# Patient Record
Sex: MALE | Race: WHITE | Employment: OTHER | URBAN - NONMETROPOLITAN AREA
[De-identification: names, ages, dates, MRNs, and addresses within clinical notes are randomized per-mention and may not be internally consistent; named-entity substitution may affect disease eponyms.]

---

## 2020-01-30 ENCOUNTER — INITIAL CONSULT (OUTPATIENT)
Dept: PULMONOLOGY | Age: 70
End: 2020-01-30
Payer: MEDICARE

## 2020-01-30 VITALS
OXYGEN SATURATION: 98 % | WEIGHT: 271.6 LBS | BODY MASS INDEX: 43.65 KG/M2 | HEART RATE: 62 BPM | HEIGHT: 66 IN | DIASTOLIC BLOOD PRESSURE: 74 MMHG | SYSTOLIC BLOOD PRESSURE: 120 MMHG

## 2020-01-30 PROCEDURE — G8484 FLU IMMUNIZE NO ADMIN: HCPCS | Performed by: INTERNAL MEDICINE

## 2020-01-30 PROCEDURE — G8419 CALC BMI OUT NRM PARAM NOF/U: HCPCS | Performed by: INTERNAL MEDICINE

## 2020-01-30 PROCEDURE — 4040F PNEUMOC VAC/ADMIN/RCVD: CPT | Performed by: INTERNAL MEDICINE

## 2020-01-30 PROCEDURE — 3017F COLORECTAL CA SCREEN DOC REV: CPT | Performed by: INTERNAL MEDICINE

## 2020-01-30 PROCEDURE — 1036F TOBACCO NON-USER: CPT | Performed by: INTERNAL MEDICINE

## 2020-01-30 PROCEDURE — G8427 DOCREV CUR MEDS BY ELIG CLIN: HCPCS | Performed by: INTERNAL MEDICINE

## 2020-01-30 PROCEDURE — 1123F ACP DISCUSS/DSCN MKR DOCD: CPT | Performed by: INTERNAL MEDICINE

## 2020-01-30 PROCEDURE — 99203 OFFICE O/P NEW LOW 30 MIN: CPT | Performed by: INTERNAL MEDICINE

## 2020-01-30 RX ORDER — PANTOPRAZOLE SODIUM 40 MG/1
TABLET, DELAYED RELEASE ORAL
COMMUNITY
Start: 2019-12-26

## 2020-01-30 RX ORDER — FUROSEMIDE 20 MG/1
TABLET ORAL
COMMUNITY
Start: 2019-12-26

## 2020-01-30 RX ORDER — CLOPIDOGREL BISULFATE 75 MG/1
TABLET ORAL
COMMUNITY
Start: 2019-12-15

## 2020-01-30 RX ORDER — CALCIUM CITRATE/VITAMIN D3 200MG-6.25
TABLET ORAL
COMMUNITY
Start: 2019-11-08 | End: 2021-01-13

## 2020-01-30 RX ORDER — EZETIMIBE 10 MG/1
TABLET ORAL
COMMUNITY
Start: 2020-01-24

## 2020-01-30 RX ORDER — RANOLAZINE 500 MG/1
TABLET, EXTENDED RELEASE ORAL
COMMUNITY
Start: 2019-12-26

## 2020-01-30 RX ORDER — ISOSORBIDE MONONITRATE 30 MG/1
TABLET, EXTENDED RELEASE ORAL
COMMUNITY
Start: 2019-12-26

## 2020-01-30 RX ORDER — AMLODIPINE BESYLATE AND BENAZEPRIL HYDROCHLORIDE 5; 10 MG/1; MG/1
CAPSULE ORAL
COMMUNITY
Start: 2020-01-27

## 2020-01-30 RX ORDER — INSULIN GLARGINE 100 [IU]/ML
INJECTION, SOLUTION SUBCUTANEOUS
COMMUNITY
Start: 2019-11-04 | End: 2021-01-13

## 2020-01-30 RX ORDER — ATORVASTATIN CALCIUM 40 MG/1
TABLET, FILM COATED ORAL
COMMUNITY
Start: 2019-12-03

## 2020-01-30 NOTE — PROGRESS NOTES
apneas during sleep noticed: No.   History of choking and gasping sensation at night time: No.  History of headaches in the morning:No.  History of dry mouth in the morning: Yes. History of palpitations during night time/nocturnal awakenings: No.  History of sweating during night time/nocturnal awakenings: Yes    General:  History of head injury in the past: No.    History of seizures: No.   Rest less legs syndrome symptoms:NO  History suggestive of periodic limb movements during sleep: NO  History suggestive of hypnagogic hallucinations: NO  History suggestive of hypnopompic hallucinations: NO  History suggestive of sleep talking:NO  History suggestive of sleep walking:NO. He used to walk during sleep in the past I.e during his teenage years. He denies any recent hx of sleep walking. History suggestive of bruxism: No.   History suggestive of cataplexy: NO  History suggestive of sleep paralysis: NO    Family history of sleep disorders:  Family history of obstructive sleep apnea: NO.  Family history of Narcolepsy: NO.  Family history of Rest less legs syndrome : NO.    History regarding old sleep studies:  Prior history of sleep study: No.  Using CPAP device: No.  Currently using home Oxygen: NO.       Patient considerations:  Is the patient is ambulatory: Yes  Patient is currently using: None of these Wheelchair, Rodger Megan or [employer]. Para/Quadriplegic: NO  Hearing deficit : NO  Claustrophobic: NO  MDD : NO  Blind: NO  Incontinent: NO  Para/Quadraplegi: NO.   Need transportation to and from Sleep Center:NO      Social History:  Social History     Tobacco Use    Smoking status: Former Smoker     Packs/day: 0.50     Years: 26.00     Pack years: 13.00     Types: Cigarettes     Start date:      Last attempt to quit: 2004     Years since quittin.0    Smokeless tobacco: Former User     Quit date:    Substance Use Topics    Alcohol use: Not on file    Drug use: Never   .   He is currently working: No. [x] Retired. No past medical history on file. No past surgical history on file. No Known Allergies    Current Outpatient Medications   Medication Sig Dispense Refill    amLODIPine-benazepril (LOTREL) 5-10 MG per capsule       atorvastatin (LIPITOR) 40 MG tablet       clopidogrel (PLAVIX) 75 MG tablet       ezetimibe (ZETIA) 10 MG tablet TK 1 T PO QD      furosemide (LASIX) 20 MG tablet       TRUE METRIX BLOOD GLUCOSE TEST strip TEST BLOOD SUGAR BID FASTING AND AT 4PM      LANTUS SOLOSTAR 100 UNIT/ML injection pen       isosorbide mononitrate (IMDUR) 30 MG extended release tablet       metFORMIN (GLUCOPHAGE) 1000 MG tablet       pantoprazole (PROTONIX) 40 MG tablet       ranolazine (RANEXA) 500 MG extended release tablet       metoprolol tartrate (LOPRESSOR) 25 MG tablet        No current facility-administered medications for this visit. No family history on file. Review of Systems:   General/Constitutional: He lost ~10lbs of weight in the last 6months with normal appetite. No fever or chills. HENT: Negative. Eyes: Negative. Upper respiratory tract: No nasal stuffiness or post nasal drip. Lower respiratory tract/ lungs: No cough or sputum production. No hemoptysis. Cardiovascular: No palpitations or chest pain. Gastrointestinal: No nausea or vomiting. Neurological: No focal neurologiacal weakness. Extremities: No edema. Musculoskeletal: No complaints. Genitourinary: No complaints. Hematological: Negative. Psychiatric/Behavioral: Negative. Skin: No itching. /74 (Site: Left Upper Arm, Position: Sitting, Cuff Size: Medium Adult)   Pulse 62   Ht 5' 6\" (1.676 m)   Wt 271 lb 9.6 oz (123.2 kg)   SpO2 98% Comment: on room air at rest  BMI 43.84 kg/m²   Mallampati airway Class:IV  Neck Circumference: 19.5 Inches  [place] sleepiness score 1/30/20: 9  SAQLI: 87      Physical Exam   Nursing note and vitals reviewed.    Constitutional: Patient appears moderately Uvulopalatopharyngoplasty, maxillomandibular ostomy and tracheostomy as last option. At the end of discussion, he is not decided on his   treatment if he found to have obstructive sleep apnea at this time.  -We will see Sirena Alston back in 1week after the sleep study to go over the sleep study results and further management options.  -He was educated to practice good sleep hygiene practices. He  was provided with a good sleep hygiene hand out.  -Jordana Maciel was advised to make earlier appointment with my clinic if he develops any worsening of sleep symptoms. He verbalizes understanding.  -He was advised to loose weight by controlling diet and doing exercise once cleared by his cardiologist   - Sirena Alston was educated about my impression and plan. He verbalizes understanding.

## 2020-01-30 NOTE — PROGRESS NOTES
Chief Complaint: Po Neighbours is here for a sleep consult. Referred by Donna Burt for snoring.  No prior studies    Mallampati airway Class:IV  Neck Circumference: 19.5 Inches    [place] sleepiness score 1/30/20: 9  SAQLI: 87

## 2020-03-11 ENCOUNTER — OFFICE VISIT (OUTPATIENT)
Dept: PULMONOLOGY | Age: 70
End: 2020-03-11
Payer: MEDICARE

## 2020-03-11 VITALS
HEART RATE: 62 BPM | SYSTOLIC BLOOD PRESSURE: 122 MMHG | BODY MASS INDEX: 42.43 KG/M2 | OXYGEN SATURATION: 93 % | DIASTOLIC BLOOD PRESSURE: 62 MMHG | HEIGHT: 66 IN | WEIGHT: 264 LBS

## 2020-03-11 PROCEDURE — 4040F PNEUMOC VAC/ADMIN/RCVD: CPT | Performed by: NURSE PRACTITIONER

## 2020-03-11 PROCEDURE — G8417 CALC BMI ABV UP PARAM F/U: HCPCS | Performed by: NURSE PRACTITIONER

## 2020-03-11 PROCEDURE — G8484 FLU IMMUNIZE NO ADMIN: HCPCS | Performed by: NURSE PRACTITIONER

## 2020-03-11 PROCEDURE — G8427 DOCREV CUR MEDS BY ELIG CLIN: HCPCS | Performed by: NURSE PRACTITIONER

## 2020-03-11 PROCEDURE — 1036F TOBACCO NON-USER: CPT | Performed by: NURSE PRACTITIONER

## 2020-03-11 PROCEDURE — 3017F COLORECTAL CA SCREEN DOC REV: CPT | Performed by: NURSE PRACTITIONER

## 2020-03-11 PROCEDURE — 1123F ACP DISCUSS/DSCN MKR DOCD: CPT | Performed by: NURSE PRACTITIONER

## 2020-03-11 PROCEDURE — 99214 OFFICE O/P EST MOD 30 MIN: CPT | Performed by: NURSE PRACTITIONER

## 2020-03-11 RX ORDER — ZOLPIDEM TARTRATE 10 MG/1
10 TABLET ORAL NIGHTLY PRN
Qty: 1 TABLET | Refills: 0 | Status: SHIPPED | OUTPATIENT
Start: 2020-03-11 | End: 2020-03-12

## 2020-03-11 RX ORDER — ZOLPIDEM TARTRATE 10 MG/1
10 TABLET ORAL NIGHTLY PRN
Qty: 1 TABLET | Refills: 0 | Status: SHIPPED | OUTPATIENT
Start: 2020-03-11 | End: 2020-03-11

## 2020-03-11 NOTE — PROGRESS NOTES
TRUE METRIX BLOOD GLUCOSE TEST strip TEST BLOOD SUGAR BID FASTING AND AT 4PM      LANTUS SOLOSTAR 100 UNIT/ML injection pen       isosorbide mononitrate (IMDUR) 30 MG extended release tablet       metFORMIN (GLUCOPHAGE) 1000 MG tablet       pantoprazole (PROTONIX) 40 MG tablet       ranolazine (RANEXA) 500 MG extended release tablet       metoprolol tartrate (LOPRESSOR) 25 MG tablet        No current facility-administered medications for this visit. ROS  Review of Systems  General/Constitutional: No recent loss of weight or appetite changes. No fever or chills. HENT: Negative. Eyes: Negative. Upper respiratory tract: No nasal stuffiness or post nasal drip. Lower respiratory tract/ lungs: No cough or sputum production. No hemoptysis. Cardiovascular: No palpitations or chest pain. Gastrointestinal: No nausea or vomiting. Neurological: No focal neurologiacal weakness. Extremities: No edema. Musculoskeletal: No complaints. Genitourinary: No complaints. Hematological: Negative. Psychiatric/Behavioral: Negative. Skin: No itching. Exam  Vitals -  /62 (Site: Left Upper Arm, Position: Sitting, Cuff Size: Medium Adult)   Pulse 62   Ht 5' 6\" (1.676 m)   Wt 264 lb (119.7 kg)   SpO2 93% Comment: room air at rest  BMI 42.61 kg/m²    Body mass index is 42.61 kg/m². Oxygen level -91.0 Room Air  Physical Exam   General Appearance - Moderately built, moderately nourished, in no acute distress. HEENT - Head is normocephalic, atraumatic. PERRL. Oral mucosa pink and moist, no oral thrush. Mallampati Score - IV (only hard palate visible). Neck - Supple, symmetrical, trachea midline and soft. Lungs - Clear to auscultation, no wheezes, rales or rhonchi, aeration good. Cardiovascular - Heart sounds are normal. Regular rhythm normal rate without murmur, gallop or rub. Abdomen - Soft, nontender, non-distended. Neurologic - Alert and oriented x 3.   Skin - No bruising or bleeding. Extremities - No cyanosis, clubbing or edema. Assessment   Diagnosis Orders   1. MIMI (obstructive sleep apnea)  Sleep Study with PAP Titration    zolpidem (AMBIEN) 10 MG tablet    DISCONTINUED: zolpidem (AMBIEN) 10 MG tablet   2. Pre-op evaluation        Recommendations  I reviewed the sleep study results in detail with Baldomero Terrazas. We discussed various treatment options including positional therapy, OAT and PAP therapies along with the benefits and limitations of each. We also discussed the health risks with untreated MIMI. Due to the severity of his apnea and medical history along with surgical risks, PAP therapies is recommended. He agrees to proceed with PAP titration with mask fitting. Educated on surgical risks with untreated MIMI. He is to have carotid endarterectomy, advised surgeon will likely want MIMI treated as well. He was very reluctant on CPAP but after much education, agreed to treat. He had poor sleep efficiency (trouble sleeping in different environment). Rx for Ambien to take night of study if needed. Follow up 8 weeks after PAP set up with download.     Electronically signed by VIVIAN Chen CNP on 3/11/2020 at 9:42 AM

## 2020-03-11 NOTE — PATIENT INSTRUCTIONS
allergies. · Try a continuous positive airway pressure (CPAP) breathing machine if your doctor recommends it. The machine keeps your airway open when you sleep. · If CPAP does not work for you, ask your doctor if you can try other breathing machines. A bilevel positive airway pressure machine uses one type of air pressure for breathing in and another type for breathing out. Another device raises or lowers air pressure as needed while you breathe. · Talk to your doctor if:  ? Your nose feels dry or bleeds when you use one of these machines. You may need to increase moisture in the air. A humidifier may help. ? Your nose is runny or stuffy from using a breathing machine. Decongestants or a corticosteroid nasal spray may help. ? You are sleepy during the day and it gets in the way of the normal things you do. Do not drive when you are drowsy. When should you call for help? Watch closely for changes in your health, and be sure to contact your doctor if:    · You still have sleep apnea even though you have made lifestyle changes.     · You are thinking of trying a device such as CPAP.     · You are having problems using a CPAP or similar machine. Where can you learn more? Go to https://[employer].Giv.to. org and sign in to your [facility] account. Enter E881 in the Open Learning box to learn more about \"Sleep Apnea: Care Instructions. \"     If you do not have an account, please click on the \"Sign Up Now\" link. Current as of: June 9, 2019  Content Version: 12.3  © 1586-2272 Healthwise, Incorporated. Care instructions adapted under license by [facility] ([facility]). If you have questions about a medical condition or this instruction, always ask your healthcare professional. [place] [address] any warranty or liability for your use of this information.

## 2020-05-11 ENCOUNTER — TELEPHONE (OUTPATIENT)
Dept: PULMONOLOGY | Age: 70
End: 2020-05-11

## 2020-06-17 ENCOUNTER — OFFICE VISIT (OUTPATIENT)
Dept: PULMONOLOGY | Age: 70
End: 2020-06-17
Payer: MEDICARE

## 2020-06-17 VITALS
HEART RATE: 64 BPM | SYSTOLIC BLOOD PRESSURE: 116 MMHG | HEIGHT: 66 IN | OXYGEN SATURATION: 95 % | DIASTOLIC BLOOD PRESSURE: 64 MMHG | WEIGHT: 271 LBS | BODY MASS INDEX: 43.55 KG/M2

## 2020-06-17 PROCEDURE — G8427 DOCREV CUR MEDS BY ELIG CLIN: HCPCS | Performed by: NURSE PRACTITIONER

## 2020-06-17 PROCEDURE — 1036F TOBACCO NON-USER: CPT | Performed by: NURSE PRACTITIONER

## 2020-06-17 PROCEDURE — 3017F COLORECTAL CA SCREEN DOC REV: CPT | Performed by: NURSE PRACTITIONER

## 2020-06-17 PROCEDURE — G8417 CALC BMI ABV UP PARAM F/U: HCPCS | Performed by: NURSE PRACTITIONER

## 2020-06-17 PROCEDURE — 1123F ACP DISCUSS/DSCN MKR DOCD: CPT | Performed by: NURSE PRACTITIONER

## 2020-06-17 PROCEDURE — 99214 OFFICE O/P EST MOD 30 MIN: CPT | Performed by: NURSE PRACTITIONER

## 2020-06-17 PROCEDURE — 4040F PNEUMOC VAC/ADMIN/RCVD: CPT | Performed by: NURSE PRACTITIONER

## 2020-06-17 RX ORDER — LANSOPRAZOLE 30 MG/1
30 CAPSULE, DELAYED RELEASE ORAL 2 TIMES DAILY
COMMUNITY

## 2020-06-17 RX ORDER — AMOXICILLIN 500 MG/1
500 CAPSULE ORAL 4 TIMES DAILY
COMMUNITY

## 2020-06-17 RX ORDER — CLARITHROMYCIN 500 MG/1
500 TABLET, COATED ORAL 2 TIMES DAILY
COMMUNITY

## 2020-06-17 RX ORDER — ASPIRIN 81 MG/1
81 TABLET ORAL DAILY
COMMUNITY

## 2020-07-10 ENCOUNTER — CLINICAL DOCUMENTATION (OUTPATIENT)
Dept: PULMONOLOGY | Age: 70
End: 2020-07-10

## 2020-07-10 NOTE — PROGRESS NOTES
Download reviewed on BiPAP 20/14, AHI is improved. Linward Sacks is dated 6/9 to 7/8/20 and has VAuto settings mixed in until 6/17/20. AHI trends improved with change. No calls from patient. Has follow up in august, continue current settings for now.     Electronically signed by VIVIAN Ayoub CNP on 7/10/2020 at 9:57 AM

## 2020-08-19 ENCOUNTER — OFFICE VISIT (OUTPATIENT)
Dept: PULMONOLOGY | Age: 70
End: 2020-08-19
Payer: MEDICARE

## 2020-08-19 VITALS
WEIGHT: 257 LBS | OXYGEN SATURATION: 99 % | SYSTOLIC BLOOD PRESSURE: 124 MMHG | HEIGHT: 66 IN | DIASTOLIC BLOOD PRESSURE: 62 MMHG | BODY MASS INDEX: 41.3 KG/M2 | HEART RATE: 69 BPM

## 2020-08-19 PROCEDURE — 3017F COLORECTAL CA SCREEN DOC REV: CPT | Performed by: NURSE PRACTITIONER

## 2020-08-19 PROCEDURE — 99213 OFFICE O/P EST LOW 20 MIN: CPT | Performed by: NURSE PRACTITIONER

## 2020-08-19 PROCEDURE — 1123F ACP DISCUSS/DSCN MKR DOCD: CPT | Performed by: NURSE PRACTITIONER

## 2020-08-19 PROCEDURE — G8427 DOCREV CUR MEDS BY ELIG CLIN: HCPCS | Performed by: NURSE PRACTITIONER

## 2020-08-19 PROCEDURE — 4040F PNEUMOC VAC/ADMIN/RCVD: CPT | Performed by: NURSE PRACTITIONER

## 2020-08-19 PROCEDURE — 1036F TOBACCO NON-USER: CPT | Performed by: NURSE PRACTITIONER

## 2020-08-19 PROCEDURE — G8417 CALC BMI ABV UP PARAM F/U: HCPCS | Performed by: NURSE PRACTITIONER

## 2020-08-19 NOTE — PROGRESS NOTES
[facility] and [address] [place] [address]         [id number]  8/19/2020   Chief Complaint   Patient presents with    Follow-up     MIMI 8 week follow up with a download        Pt of Dr. Gabriela COREAS Download:   Karri Gonzales or initial AHI: 41.9   Date of initial study: 2/19/20  Weight of initial study: 271  [x] Compliant  97%   [] Noncompliant 3%     PAP Type VAuto Level  20/14 cmH20   Avg Hrs/Day 8:57  AHI: 4.6   Recorded compliance dates, 7/18/20 to 8/16/20   Machine/Mfg: ResMed  Interface: FFM    Provider:  [][facility]  [][employer] [][employer]  [x][facility]         [][facility] []Other:     Neck Size: 19.5  Mallampati Mallampati 4  ESS:  4    Here is a scan of the most recent download:            Presentation:   Agapito Weinberg presents for sleep medicine follow up for obstructive sleep apnea. Since the last visit, Agapito Weinberg continues to do well with treatment, noting benefit. He continues in [place] and for unclear reasons, was not changed to BiPAP as ordered last visit. He had elevated AHI, primarily central events. His weight is down 14 lbs since last seen and AHI is controlled when no mask leak. He is now using foam mask. Scheduled to have gastric surgery August 28th. Stable cardiac status. Equipment issues: The pressure is acceptable, the mask is acceptable and he is using the humidity. Sleep issues:  Do you feel better? Yes  More rested? Yes   Better concentration? yes    Progress History:   Since last visit any new medical issues? No  New ER or hospitlal visits? No  Any new or changes in medicines? No  Any new sleep medicines?  No      Past Medical History:   Diagnosis Date    CAD (coronary artery disease)     Carotid stenosis     Diabetes ([facility] [address]     HLD (hyperlipidemia)     HTN (hypertension)     Obesity     MIMI treated with BiPAP        Past Surgical History:   Procedure Laterality Date    CAROTID ENDARTERECTOMY      CORONARY ANGIOPLASTY WITH STENT PLACEMENT      CORONARY ARTERY BYPASS GRAFT Social History     Tobacco Use    Smoking status: Former Smoker     Packs/day: 0.50     Years: 26.00     Pack years: 13.00     Types: Cigarettes     Start date:      Last attempt to quit:      Years since quittin.6    Smokeless tobacco: Former User     Quit date:    Substance Use Topics    Alcohol use: Not on file    Drug use: Never       No Known Allergies    Current Outpatient Medications   Medication Sig Dispense Refill    lansoprazole (PREVACID) 30 MG delayed release capsule Take 30 mg by mouth 2 times daily      amoxicillin (AMOXIL) 500 MG capsule Take 500 mg by mouth 4 times daily      clarithromycin (BIAXIN) 500 MG tablet Take 500 mg by mouth 2 times daily      aspirin 81 MG EC tablet Take 81 mg by mouth daily      amLODIPine-benazepril (LOTREL) 5-10 MG per capsule       atorvastatin (LIPITOR) 40 MG tablet       clopidogrel (PLAVIX) 75 MG tablet       ezetimibe (ZETIA) 10 MG tablet TK 1 T PO QD      furosemide (LASIX) 20 MG tablet       TRUE METRIX BLOOD GLUCOSE TEST strip TEST BLOOD SUGAR BID FASTING AND AT 4PM      LANTUS SOLOSTAR 100 UNIT/ML injection pen       isosorbide mononitrate (IMDUR) 30 MG extended release tablet       metFORMIN (GLUCOPHAGE) 1000 MG tablet       pantoprazole (PROTONIX) 40 MG tablet       ranolazine (RANEXA) 500 MG extended release tablet       metoprolol tartrate (LOPRESSOR) 25 MG tablet        No current facility-administered medications for this visit. History reviewed. No pertinent family history. Review of Systems   General/Constitutional: No recent loss of weight or appetite changes. No fever or chills. HENT: Negative. Eyes: Negative. Upper respiratory tract: No nasal stuffiness or post nasal drip. Lower respiratory tract/ lungs: No cough or sputum production. No hemoptysis. Cardiovascular: No palpitations or chest pain. Gastrointestinal: No nausea or vomiting.   Neurological: No focal neurologiacal weakness. Extremities: No edema. Musculoskeletal: No complaints. Genitourinary: No complaints. Hematological: Negative. Psychiatric/Behavioral: Negative. Skin: No itching. Physical Exam:    BMI: Body mass index is 41.48 kg/m². Wt Readings from Last 3 Encounters:   08/19/20 257 lb (116.6 kg)   06/17/20 271 lb (122.9 kg)   03/11/20 264 lb (119.7 kg)     Weight lost 14 lbs since last seen/study. Vitals: /62 (Site: Left Upper Arm, Position: Sitting, Cuff Size: Large Adult)   Pulse 69   Ht 5' 6\" (1.676 m)   Wt 257 lb (116.6 kg)   SpO2 99% Comment: on room air at rest  BMI 41.48 kg/m²         General Appearance - Moderately built, moderately nourished, in no acute distress. HEENT - Head is normocephalic, atraumatic. PERRL. Oral mucosa pink and moist, no oral thrush. Mallampati Score - IV (only hard palate visible). Neck - Supple, symmetrical, trachea midline and soft. Lungs - Clear to auscultation, no wheezes, rales or rhonchi, aeration good. Cardiovascular - Heart sounds are normal. Regular rhythm normal rate without murmur, gallop or rub. Abdomen - Soft, nontender, non-distended. Neurologic - Alert and oriented x 3. Skin - No bruising or bleeding. Extremities - No cyanosis, clubbing or edema. ASSESSMENT/DIAGNOSIS     Diagnosis Orders   1. MIMI treated with BiPAP     2. Preoperative clearance              Plan   Do you need any equipment today? No.  -He is tolerating current settings well, AHI is controlled so leave VAuto mode with weight loss. -Obtain most recent echo results from Dr. Kashmir Frazier to assess EF.  -He is cleared for surgery and advised to take PAP machine with him to avoid post-operative complications.  -Educated on symptoms to monitor for with weight loss and to call if need for pressure changes. - He was advised to continue current positive airway pressure therapy with above described pressure.    - He was advised to keep good compliance with current recommended pressure to get optimal results and clinical improvement.  - Recommend 7-9 hours of sleep with PAP treatment. - He was advised to call [employer] company regarding supplies if needed.   -He is to call my office for earlier appointment if needed for worsening of sleep symptoms.   - He was instructed on weight loss. - Addy Van was educated about my impression and plan and verbalizes understanding. We will see Chiara Alfredo back in: 3 months with download.     Electronically signed by VIVIAN Schulz CNP on 8/19/2020 at 9:28 AM

## 2020-11-18 ENCOUNTER — OFFICE VISIT (OUTPATIENT)
Dept: PULMONOLOGY | Age: 70
End: 2020-11-18
Payer: MEDICARE

## 2020-11-18 VITALS
HEIGHT: 66 IN | SYSTOLIC BLOOD PRESSURE: 122 MMHG | DIASTOLIC BLOOD PRESSURE: 66 MMHG | HEART RATE: 85 BPM | WEIGHT: 224.6 LBS | BODY MASS INDEX: 36.1 KG/M2 | OXYGEN SATURATION: 98 %

## 2020-11-18 PROCEDURE — G8484 FLU IMMUNIZE NO ADMIN: HCPCS | Performed by: NURSE PRACTITIONER

## 2020-11-18 PROCEDURE — 1036F TOBACCO NON-USER: CPT | Performed by: NURSE PRACTITIONER

## 2020-11-18 PROCEDURE — G8427 DOCREV CUR MEDS BY ELIG CLIN: HCPCS | Performed by: NURSE PRACTITIONER

## 2020-11-18 PROCEDURE — 4040F PNEUMOC VAC/ADMIN/RCVD: CPT | Performed by: NURSE PRACTITIONER

## 2020-11-18 PROCEDURE — 99213 OFFICE O/P EST LOW 20 MIN: CPT | Performed by: NURSE PRACTITIONER

## 2020-11-18 PROCEDURE — G8417 CALC BMI ABV UP PARAM F/U: HCPCS | Performed by: NURSE PRACTITIONER

## 2020-11-18 PROCEDURE — 3017F COLORECTAL CA SCREEN DOC REV: CPT | Performed by: NURSE PRACTITIONER

## 2020-11-18 PROCEDURE — 1123F ACP DISCUSS/DSCN MKR DOCD: CPT | Performed by: NURSE PRACTITIONER

## 2020-11-18 NOTE — PROGRESS NOTES
[facility] [address]         [id number]  11/18/2020   Chief Complaint   Patient presents with    Follow-up     MIMI 3 month follow up with a download        Pt of Dr. Cody Ortega    PAP Download:   Tesha Copeland or initial AHI: 41.9   Date of initial study: 2/19/20  Weight of initial study: 271  [] Compliant  3%   [x] Noncompliant 7%     PAP Type VAuto Level  20/14 cmH20   Avg Hrs/Day: 19 minutes  AHI: 12.3   Recorded compliance dates, 10/17/20 to 11/15/20   Machine/Mfg: ResMed  Interface: FFM    Provider:  [][facility]  [][facility] [][employer]  [x][facility]         [][facility] []Other:     Neck Size: 19 inches (down from 19.5)  Mallampati Mallampati 4  ESS:  4  SAQLI: 89    Here is a scan of the most recent download:                  Presentation:   Bird Michael presents for sleep medicine follow up for obstructive sleep apnea. Since the last visit, Bird Michael had gastric sleeve and has lost 47 lbs since having his sleep study. He is not tolerating current VAuto pressures. Pressure is high causing mask leak and dry mouth. He previously did well. Equipment issues: The pressure is not acceptable, the mask is acceptable and he is using the humidity. Sleep issues:  Do you feel better? No  More rested? No   Better concentration? no    Progress History:   Since last visit any new medical issues? No  New ER or hospitlal visits? No  Any new or changes in medicines? No  Any new sleep medicines?  No      Past Medical History:   Diagnosis Date    CAD (coronary artery disease)     Carotid stenosis     [facility] ([facility] [address]     HLD (hyperlipidemia)     HTN (hypertension)     Obesity     MIMI treated with BiPAP        Past Surgical History:   Procedure Laterality Date    CAROTID ENDARTERECTOMY      CORONARY ANGIOPLASTY WITH STENT PLACEMENT      CORONARY ARTERY BYPASS GRAFT         Social History     Tobacco Use    Smoking status: Former Smoker     Packs/day: 0.50     Years: 26.00     Pack years: 13.00 Negative. Skin: No itching. Physical Exam:    BMI: Body mass index is 36.25 kg/m². Wt Readings from Last 3 Encounters:   11/18/20 224 lb 9.6 oz (101.9 kg)   08/19/20 257 lb (116.6 kg)   06/17/20 271 lb (122.9 kg)     Weight lost 47 lbs since study  Vitals: /66 (Site: Right Upper Arm, Position: Sitting, Cuff Size: Large Adult)   Pulse 85   Ht 5' 6\" (1.676 m)   Wt 224 lb 9.6 oz (101.9 kg)   SpO2 98% Comment: on room air at rest  BMI 36.25 kg/m²         General Appearance - Moderately built, moderately nourished, in no acute distress. HEENT - Head is normocephalic, atraumatic. PERRL. Oral mucosa pink and moist, no oral thrush. Mallampati Score - IV (only hard palate visible). Neck - Supple, symmetrical, trachea midline and soft. Lungs - Clear to auscultation, no wheezes, rales or rhonchi, aeration good. Cardiovascular - Heart sounds are normal. Regular rhythm normal rate without murmur, gallop or rub. Abdomen - Soft, nontender, non-distended. Neurologic - Alert and oriented x 3. Skin - No bruising or bleeding. Extremities - No cyanosis, clubbing or edema. ASSESSMENT/DIAGNOSIS     Diagnosis Orders   1. MIMI treated with BiPAP     2. S/P gastric surgery     3. Weight loss              Plan   Do you need any equipment today? No.  -Download reviewed. -Uncontrolled AHI due to increased central events correlative to too much pressure.  -Educated on difference in MIMI vs CSA. -Change VAuto to Max IPAP m18, Min EPAP 10, PS 2. To call if not tolerated.  -Educated on symptoms to monitor for with too much pressure.  -Still need echo results to evaluate EF.  -Borderline 02 levels with titration, may need to follow up with overnight pulsox, once AHI controlled. -Repeat testing when intolerant or achieves goal weight of 180 lbs. - He was advised to continue current positive airway pressure therapy with above described pressure.    - He was advised to keep good compliance with current recommended pressure to get optimal results and clinical improvement.  - Recommend 7-9 hours of sleep with PAP treatment. - He was advised to call [employer] company regarding supplies if needed.   -He is to call my office for earlier appointment if needed for worsening of sleep symptoms.   - He was instructed on weight loss. - Federico Mcghee was educated about my impression and plan and verbalizes understanding. We will see Felisha Draft back in: 8 weeks with download.     Electronically signed by VIVIAN Taylor CNP on 11/18/2020 at 4:17 PM

## 2021-01-13 ENCOUNTER — OFFICE VISIT (OUTPATIENT)
Dept: PULMONOLOGY | Age: 71
End: 2021-01-13
Payer: MEDICARE

## 2021-01-13 VITALS
HEART RATE: 61 BPM | HEIGHT: 66 IN | DIASTOLIC BLOOD PRESSURE: 72 MMHG | WEIGHT: 221.9 LBS | OXYGEN SATURATION: 99 % | SYSTOLIC BLOOD PRESSURE: 128 MMHG | TEMPERATURE: 96.8 F | BODY MASS INDEX: 35.66 KG/M2

## 2021-01-13 DIAGNOSIS — G47.33 OSA TREATED WITH BIPAP: Primary | ICD-10-CM

## 2021-01-13 PROCEDURE — G8427 DOCREV CUR MEDS BY ELIG CLIN: HCPCS | Performed by: NURSE PRACTITIONER

## 2021-01-13 PROCEDURE — G8417 CALC BMI ABV UP PARAM F/U: HCPCS | Performed by: NURSE PRACTITIONER

## 2021-01-13 PROCEDURE — 1123F ACP DISCUSS/DSCN MKR DOCD: CPT | Performed by: NURSE PRACTITIONER

## 2021-01-13 PROCEDURE — 1036F TOBACCO NON-USER: CPT | Performed by: NURSE PRACTITIONER

## 2021-01-13 PROCEDURE — 3017F COLORECTAL CA SCREEN DOC REV: CPT | Performed by: NURSE PRACTITIONER

## 2021-01-13 PROCEDURE — 4040F PNEUMOC VAC/ADMIN/RCVD: CPT | Performed by: NURSE PRACTITIONER

## 2021-01-13 PROCEDURE — 99212 OFFICE O/P EST SF 10 MIN: CPT | Performed by: NURSE PRACTITIONER

## 2021-01-13 PROCEDURE — G8484 FLU IMMUNIZE NO ADMIN: HCPCS | Performed by: NURSE PRACTITIONER

## 2021-01-13 NOTE — PROGRESS NOTES
[facility] [address]         [id number]  1/13/2021   Chief Complaint   Patient presents with    Follow-up     MIMI 8 week sleep follow up with Mireya Ritchie download        Pt of Dr. Tomasa Navarrete     PAP Download:   Original or initial AHI: 41.9  Date of initial study: 2/19/2020  Weight of initial study: 271 lb  [] Compliant  3%   [x] Noncompliant 7%     PAP Type aircurve 10 Level  6/14/20 cmh20    Avg Hrs/Day 3:12  AHI: 16.6   Recorded compliance dates, 12/12/20-1/10/21  Machine/Mfg: [employer] Radio   Interface: ffm     Provider:  []SR-HME  []Apria []Dasco  [x][employer]         [][facility]R Medical []Other:     Neck Size: 19  Mallampati Mallampati 4  ESS:  2  SAQli: 75    Here is a scan of the most recent download:                  Presentation:   Satnam Keller presents for sleep medicine follow up for obstructive sleep apnea. Since the last visit, Derian's pressure was not changed as ordered. He had gastric sleeve is Sept 2020 and has now lost 50 lbs since having his sleep study. He is not tolerating current VAuto pressures. Pressure is high causing mask leak and dry mouth. Uncontrolled AHI. He is sleeping better without using. He previously did well and noted benefit. Equipment issues: The pressure is not acceptable, the mask is acceptable and he is using the humidity. Sleep issues:  Do you feel better? No  More rested? No   Better concentration? NA    Progress History:   Since last visit any new medical issues? No  New ER or hospitlal visits? No  Any new or changes in medicines? No  Any new sleep medicines?  No      Past Medical History:   Diagnosis Date    CAD (coronary artery disease)     Carotid stenosis     Diabetes ([address]     GERD (gastroesophageal reflux disease)     HLD (hyperlipidemia)     HTN (hypertension)     Obesity     MIMI treated with BiPAP        Past Surgical History:   Procedure Laterality Date    CAROTID ENDARTERECTOMY      CORONARY ANGIOPLASTY WITH STENT PLACEMENT Gastrointestinal: No nausea or vomiting. Neurological: No focal neurologiacal weakness. Extremities: No edema. Musculoskeletal: No complaints. Genitourinary: No complaints. Hematological: Negative. Psychiatric/Behavioral: Negative. Skin: No itching. Physical Exam:    BMI: Body mass index is 35.82 kg/m². Wt Readings from Last 3 Encounters:   01/13/21 221 lb 14.4 oz (100.7 kg)   11/18/20 224 lb 9.6 oz (101.9 kg)   08/19/20 257 lb (116.6 kg)     Weight stable / unchanged  Vitals: /72 (Site: Left Upper Arm, Position: Sitting)   Pulse 61   Temp 96.8 °F (36 °C)   Ht 5' 6\" (1.676 m)   Wt 221 lb 14.4 oz (100.7 kg)   SpO2 99% Comment: on RA  BMI 35.82 kg/m²         General Appearance - Moderately built, moderately nourished, in no acute distress. HEENT - Head is normocephalic, atraumatic. PERRL. Oral mucosa pink and moist, no oral thrush. Mallampati Score - IV (only hard palate visible). Neck - Supple, symmetrical, trachea midline and soft. Lungs - Clear to auscultation, no wheezes, rales or rhonchi, aeration good. Cardiovascular - Heart sounds are normal. Regular rhythm normal rate without murmur, gallop or rub. Abdomen - Soft, nontender, non-distended. Neurologic - Alert and oriented x 3. Skin - No bruising or bleeding. Extremities - No cyanosis, clubbing or edema. ASSESSMENT/DIAGNOSIS     Diagnosis Orders   1. MIMI treated with BiPAP              Plan   Do you need any equipment today? No.  -Download reviewed. -Will re-send pressure change to [facility] with max IPAP 18, Min EPAP 8, PS 2 with repeat download in 2 weeks. To call if not tolerated. -If continued issues, may need re-titration. His weight has plateaued but goal is under 200 lbs. - He was advised to continue current positive airway pressure therapy with above described pressure. - He was advised to keep good compliance with current recommended pressure to get optimal results and clinical improvement. - Recommend 7-9 hours of sleep with PAP treatment. - He was advised to call [employer] company regarding supplies if needed.   -He is to call my office for earlier appointment if needed for worsening of sleep symptoms.   - He was instructed on weight loss. - Nusrat Mehta was educated about my impression and plan and verbalizes understanding. We will see Lina Clarke back in: 8 weeks with download.     VIVIAN Godfrey - CNP  1/13/2021 1:39 PM

## 2021-01-28 ENCOUNTER — CLINICAL DOCUMENTATION (OUTPATIENT)
Dept: PULMONOLOGY | Age: 71
End: 2021-01-28

## 2021-01-28 NOTE — PROGRESS NOTES
Spoke with Satnam Keller. Download reviewed with change to [facility]. Spoke with patient, tolerating better. Just got new supplies, leak is improved. Based on findings, will decrease pressure a little more, to call if not tolerated. Keep same follow up.     Electronically signed by VIVIAN Coon CNP on 1/28/2021 at 3:12 PM

## 2021-03-03 ENCOUNTER — CLINICAL DOCUMENTATION (OUTPATIENT)
Dept: PULMONOLOGY | Age: 71
End: 2021-03-03

## 2021-03-03 NOTE — PROGRESS NOTES
Download reviewed with decrease in pressure and AHI remains controlled. No calls from patient, continue current settings. Has follow up in May.     Electronically signed by VIVIAN Chavira CNP on 3/3/2021 at 9:57 AM  '

## 2021-05-05 ENCOUNTER — OFFICE VISIT (OUTPATIENT)
Dept: PULMONOLOGY | Age: 71
End: 2021-05-05
Payer: MEDICARE

## 2021-05-05 VITALS
DIASTOLIC BLOOD PRESSURE: 68 MMHG | HEART RATE: 51 BPM | TEMPERATURE: 97.8 F | WEIGHT: 224 LBS | SYSTOLIC BLOOD PRESSURE: 126 MMHG | OXYGEN SATURATION: 98 % | HEIGHT: 66 IN | BODY MASS INDEX: 36 KG/M2

## 2021-05-05 DIAGNOSIS — Z98.890 S/P GASTRIC SURGERY: ICD-10-CM

## 2021-05-05 DIAGNOSIS — E66.01 CLASS 3 SEVERE OBESITY DUE TO EXCESS CALORIES WITH SERIOUS COMORBIDITY AND BODY MASS INDEX (BMI) OF 40.0 TO 44.9 IN ADULT (HCC): ICD-10-CM

## 2021-05-05 DIAGNOSIS — G47.33 OSA TREATED WITH BIPAP: Primary | ICD-10-CM

## 2021-05-05 PROCEDURE — 1036F TOBACCO NON-USER: CPT | Performed by: NURSE PRACTITIONER

## 2021-05-05 PROCEDURE — 1123F ACP DISCUSS/DSCN MKR DOCD: CPT | Performed by: NURSE PRACTITIONER

## 2021-05-05 PROCEDURE — 4040F PNEUMOC VAC/ADMIN/RCVD: CPT | Performed by: NURSE PRACTITIONER

## 2021-05-05 PROCEDURE — 3017F COLORECTAL CA SCREEN DOC REV: CPT | Performed by: NURSE PRACTITIONER

## 2021-05-05 PROCEDURE — G8427 DOCREV CUR MEDS BY ELIG CLIN: HCPCS | Performed by: NURSE PRACTITIONER

## 2021-05-05 PROCEDURE — 99213 OFFICE O/P EST LOW 20 MIN: CPT | Performed by: NURSE PRACTITIONER

## 2021-05-05 PROCEDURE — G8417 CALC BMI ABV UP PARAM F/U: HCPCS | Performed by: NURSE PRACTITIONER

## 2021-05-05 ASSESSMENT — ENCOUNTER SYMPTOMS
DIARRHEA: 0
EYES NEGATIVE: 1
VOMITING: 0
ABDOMINAL PAIN: 0
NAUSEA: 0
COUGH: 0
SHORTNESS OF BREATH: 0
WHEEZING: 0

## 2022-04-05 ENCOUNTER — TELEPHONE (OUTPATIENT)
Dept: PULMONOLOGY | Age: 72
End: 2022-04-05

## 2022-04-05 NOTE — TELEPHONE ENCOUNTER
Pt called, because someone called him and no messages were in the chart. Pt has an appt on 5/4 in [facility] but he is not using his PAP. Breaks his skin out and could not get that resolved with DME. He is fine without it he says. Not sure if he will need the F/U?

## 2022-04-06 NOTE — TELEPHONE ENCOUNTER
He was using compliantly last year and has baseline of severe sleep apnea.  He should keep his follow up with me to discuss his sleep apnea and other options

## 2022-05-03 NOTE — PROGRESS NOTES
[facility]      Bri Dillard         [id number]  5/4/2022   Chief Complaint   Patient presents with    Follow-up     1 year wanda with brant vega        Pt of Dr. Kacey Ya    PAP Download:   Original or initial AHI: 41.9     Date of initial study: 2/19/20      Compliant  10%     Noncompliant 0 %     PAP Type VAuto    Level  14/7/2   Avg Hrs/Day 7 hours 11min  AHI: 10.7   Recorded compliance dates : 4/2/22-5/1/22  Machine/Mfg:   [x] ResMed    [] Respironics/Dreamstation   Interface:   [] Nasal    [] Nasal pillows   [x] FFM      Provider:      [] SR-HME     []Apria     [] Dasco    [x] [address]    [] [employer]               [] [facility]R [facility]      [] Adaptive    [] [address]      [] Other    Neck Size: 19  Mallampati 4  ESS:  2  SAQLI: 85    Here is a scan of the most recent download:              Presentation:   Katja Willams presents for sleep medicine follow up for obstructive sleep apnea  Since the last visit, Katja Willams is not using his PAP compliantly. States he developed a skin rash from the mask and stopped using it routinely at this time. Does use \" some nights\" , sleeps better without it. Tosses and turns and this causes mask to move around      Equipment issues: The pressure is  acceptable, the mask is acceptable     Sleep issues:  Do you feel better? No  More rested? No   Better concentration? no    Progress History:   Since last visit any new medical issues? No  New ER or hospital visits? No  Any new or changes in medicines? No  Any new sleep medicines? No    Review of Systems -   Review of Systems   Constitutional: Positive for unexpected weight change. Negative for activity change, appetite change, chills, fatigue and fever. HENT: Negative. Eyes: Negative. Respiratory: Negative for cough, shortness of breath and wheezing. Cardiovascular: Negative for chest pain, palpitations and leg swelling.    Gastrointestinal: Negative for abdominal pain, diarrhea, nausea and vomiting. Genitourinary: Negative. Musculoskeletal: Negative. Skin: Negative. Neurological: Negative. Hematological: Negative. Psychiatric/Behavioral: Positive for sleep disturbance. Physical Exam:    BMI:  Body mass index is 40.38 kg/m². Wt Readings from Last 3 Encounters:   05/04/22 250 lb 3.2 oz (113.5 kg)   05/05/21 224 lb (101.6 kg)   01/13/21 221 lb 14.4 oz (100.7 kg)     Weight is fluctuating:   S/p gastric bypass 9/2020- starting wt 271 lbs, got down to 221 lbs last year, now up to 250 lbs   Vitals: /86 (Site: Left Upper Arm, Position: Sitting, Cuff Size: Medium Adult)   Pulse 62   Temp 97.8 °F (36.6 °C) (Oral)   Ht 5' 6\" (1.676 m)   Wt 250 lb 3.2 oz (113.5 kg)   SpO2 100%   BMI 40.38 kg/m²       Physical Exam  Vitals and nursing note reviewed. Constitutional:       Appearance: Normal appearance. He is obese. HENT:      Head: Normocephalic and atraumatic. Mouth/Throat:      Pharynx: Oropharynx is clear. Eyes:      Conjunctiva/sclera: Conjunctivae normal.   Pulmonary:      Effort: Pulmonary effort is normal. No tachypnea, bradypnea or respiratory distress. Skin:     Findings: No erythema or rash. Neurological:      Mental Status: He is alert and oriented to person, place, and time. Psychiatric:         Attention and Perception: Attention normal.         Mood and Affect: Mood normal.         Speech: Speech normal.         Behavior: Behavior normal.         Thought Content: Thought content normal.         Cognition and Memory: Cognition normal.         Judgment: Judgment normal.           ASSESSMENT/DIAGNOSIS     Diagnosis Orders   1. MIMI treated with BiPAP  DME Order for CPAP as OP   2. Poor compliance with continuous positive airway pressure treatment     3. S/P gastric surgery     4. Class 3 severe obesity due to excess calories with serious comorbidity and body mass index (BMI) of 40.0 to 44.9 in adult ([facility] [address]     5.  Weight gain              Plan   Do you need any equipment today? Yes - updated Rx for supplies  - Download reviewed and discussed with patient  - He  was advised to continue current positive airway pressure therapy with above described pressure. - He  Advised to work on better [employer] with current recommended pressure to get optimal results and clinical improvement- educated on health risks of untreated sleep apnea   - Recommend 7-9 hours of sleep with PAP  - He was advised to call [employer] regarding supplies if needed.   -He call my office for earlier appointment if needed for worsening of sleep symptoms.   - He was instructed on weight loss  - Saloni Jarquin was educated about my impression and plan. Patient verbalizesunderstanding.     We will see Magdiel Shields back in: 6 months with download    Information added by my medical assistant/LPN was reviewed today    -billing based on medical decision making   Electronically signed by VIVIAN Day CNP on 5/4/2022 at 9:29 AM

## 2022-05-04 ENCOUNTER — OFFICE VISIT (OUTPATIENT)
Dept: PULMONOLOGY | Age: 72
End: 2022-05-04
Payer: MEDICARE

## 2022-05-04 VITALS
SYSTOLIC BLOOD PRESSURE: 128 MMHG | HEART RATE: 62 BPM | BODY MASS INDEX: 40.21 KG/M2 | OXYGEN SATURATION: 100 % | DIASTOLIC BLOOD PRESSURE: 86 MMHG | HEIGHT: 66 IN | WEIGHT: 250.2 LBS | TEMPERATURE: 97.8 F

## 2022-05-04 DIAGNOSIS — G47.33 OSA TREATED WITH BIPAP: Primary | ICD-10-CM

## 2022-05-04 DIAGNOSIS — Z91.14 POOR COMPLIANCE WITH CONTINUOUS POSITIVE AIRWAY PRESSURE TREATMENT: ICD-10-CM

## 2022-05-04 DIAGNOSIS — R63.5 WEIGHT GAIN: ICD-10-CM

## 2022-05-04 DIAGNOSIS — Z98.890 S/P GASTRIC SURGERY: ICD-10-CM

## 2022-05-04 DIAGNOSIS — E66.01 CLASS 3 SEVERE OBESITY DUE TO EXCESS CALORIES WITH SERIOUS COMORBIDITY AND BODY MASS INDEX (BMI) OF 40.0 TO 44.9 IN ADULT (HCC): ICD-10-CM

## 2022-05-04 PROCEDURE — G8427 DOCREV CUR MEDS BY ELIG CLIN: HCPCS | Performed by: NURSE PRACTITIONER

## 2022-05-04 PROCEDURE — 99214 OFFICE O/P EST MOD 30 MIN: CPT | Performed by: NURSE PRACTITIONER

## 2022-05-04 PROCEDURE — 1123F ACP DISCUSS/DSCN MKR DOCD: CPT | Performed by: NURSE PRACTITIONER

## 2022-05-04 PROCEDURE — 4040F PNEUMOC VAC/ADMIN/RCVD: CPT | Performed by: NURSE PRACTITIONER

## 2022-05-04 PROCEDURE — 1036F TOBACCO NON-USER: CPT | Performed by: NURSE PRACTITIONER

## 2022-05-04 PROCEDURE — 3017F COLORECTAL CA SCREEN DOC REV: CPT | Performed by: NURSE PRACTITIONER

## 2022-05-04 PROCEDURE — G8417 CALC BMI ABV UP PARAM F/U: HCPCS | Performed by: NURSE PRACTITIONER

## 2022-05-04 ASSESSMENT — ENCOUNTER SYMPTOMS
VOMITING: 0
WHEEZING: 0
SHORTNESS OF BREATH: 0
NAUSEA: 0
EYES NEGATIVE: 1
ABDOMINAL PAIN: 0
DIARRHEA: 0
COUGH: 0

## 2022-10-19 ENCOUNTER — OFFICE VISIT (OUTPATIENT)
Dept: PULMONOLOGY | Age: 72
End: 2022-10-19
Payer: MEDICARE

## 2022-10-19 VITALS
TEMPERATURE: 97.4 F | SYSTOLIC BLOOD PRESSURE: 124 MMHG | HEIGHT: 66 IN | WEIGHT: 236 LBS | OXYGEN SATURATION: 98 % | DIASTOLIC BLOOD PRESSURE: 80 MMHG | HEART RATE: 55 BPM | BODY MASS INDEX: 37.93 KG/M2

## 2022-10-19 DIAGNOSIS — G47.33 OSA TREATED WITH BIPAP: Primary | ICD-10-CM

## 2022-10-19 DIAGNOSIS — Z91.14 POOR COMPLIANCE WITH CONTINUOUS POSITIVE AIRWAY PRESSURE TREATMENT: ICD-10-CM

## 2022-10-19 PROBLEM — Z79.4 LONG TERM (CURRENT) USE OF INSULIN (HCC): Status: ACTIVE | Noted: 2022-09-29

## 2022-10-19 PROBLEM — E11.59 TYPE 2 DIABETES MELLITUS WITH OTHER CIRCULATORY COMPLICATIONS (HCC): Status: ACTIVE | Noted: 2021-07-09

## 2022-10-19 PROBLEM — E66.01 MORBID (SEVERE) OBESITY DUE TO EXCESS CALORIES (HCC): Status: ACTIVE | Noted: 2022-10-19

## 2022-10-19 PROBLEM — E78.5 HYPERLIPIDEMIA, UNSPECIFIED: Status: ACTIVE | Noted: 2021-06-24

## 2022-10-19 PROBLEM — N52.9 MALE ERECTILE DYSFUNCTION, UNSPECIFIED: Status: ACTIVE | Noted: 2021-07-09

## 2022-10-19 PROBLEM — I77.9 DISORDER OF CAROTID ARTERY (HCC): Status: ACTIVE | Noted: 2022-10-19

## 2022-10-19 PROBLEM — Z95.5 PRESENCE OF CORONARY ANGIOPLASTY IMPLANT AND GRAFT: Status: ACTIVE | Noted: 2022-10-19

## 2022-10-19 PROCEDURE — G8484 FLU IMMUNIZE NO ADMIN: HCPCS | Performed by: NURSE PRACTITIONER

## 2022-10-19 PROCEDURE — 3017F COLORECTAL CA SCREEN DOC REV: CPT | Performed by: NURSE PRACTITIONER

## 2022-10-19 PROCEDURE — G8427 DOCREV CUR MEDS BY ELIG CLIN: HCPCS | Performed by: NURSE PRACTITIONER

## 2022-10-19 PROCEDURE — 1123F ACP DISCUSS/DSCN MKR DOCD: CPT | Performed by: NURSE PRACTITIONER

## 2022-10-19 PROCEDURE — 99213 OFFICE O/P EST LOW 20 MIN: CPT | Performed by: NURSE PRACTITIONER

## 2022-10-19 PROCEDURE — 1036F TOBACCO NON-USER: CPT | Performed by: NURSE PRACTITIONER

## 2022-10-19 PROCEDURE — G8417 CALC BMI ABV UP PARAM F/U: HCPCS | Performed by: NURSE PRACTITIONER

## 2022-10-19 ASSESSMENT — ENCOUNTER SYMPTOMS
SHORTNESS OF BREATH: 0
COLOR CHANGE: 1
DIARRHEA: 0
WHEEZING: 0
VOMITING: 0
ABDOMINAL PAIN: 0
EYES NEGATIVE: 1
COUGH: 0
NAUSEA: 0

## 2022-10-19 NOTE — PROGRESS NOTES
[facility]      Justin Chung         [id number]  10/19/2022   Chief Complaint   Patient presents with    Follow-up     6 month MIMI follow up         Pt of Dr. Ayaz Houser     PAP Download:   Original or initial AHI: 41.9     Date of initial study: 02/19/2020      Compliant  0%     Noncompliant 1 %     PAP Type Auto   Level  14/7/2 cmH2O    Avg Hrs/Day 1 hour 18 minute   AHI: 4.6   Recorded compliance dates , 07/21/2022  to 10/18/2022   Machine/Mfg:   [x] ResMed    [] Respironics/Dreamstation   Interface:   [] Nasal    [] Nasal pillows   [x] FFM      Provider:      [] SR-HME     []Moreno     [] [employer]co    [x] Fransisca Dynes    [] [employer]               [] [facility]R [facility]      [] Adaptive    [] [address]      [] Other    Neck Size: 19  Mallampati Mallampati 4  ESS:  3  SAQLI: 92    Here is a scan of the most recent download:                Presentation:   Inocente Whitney presents for sleep medicine follow up for obstructive sleep apnea  Since the last visit, Inocente Whitney ruptured appendix , s/p appendectomy 9/29/22 at [facility]   Due to surgery has not recently used CPAP. Reports persistent problems with use , trouble with dry face. Currently using resmed memory foam FFM       Equipment issues: The pressure is  acceptable- ( better since it was turned down last visit ) , the mask is acceptable     Sleep issues:  Do you feel better? Yes- does see benefit with use   More rested? Sometimes   Better concentration? NA    Progress History:   Since last visit any new medical issues? Yes as above   New ER or hospital visits? No  Any new or changes in medicines? No  Any new sleep medicines? No    Review of Systems -   Review of Systems   Constitutional:  Negative for activity change, appetite change, chills, fatigue, fever and unexpected weight change. HENT: Negative. Eyes: Negative. Respiratory:  Negative for cough, shortness of breath and wheezing.     Cardiovascular:  Negative for chest pain, palpitations and leg swelling. Gastrointestinal:  Negative for abdominal pain, diarrhea, nausea and vomiting. Genitourinary: Negative. Musculoskeletal: Negative. Skin:  Positive for color change. Neurological: Negative. Hematological: Negative. Psychiatric/Behavioral: Negative. Physical Exam:    BMI:  Body mass index is 38.09 kg/m². Wt Readings from Last 3 Encounters:   10/19/22 236 lb (107 kg)   05/04/22 250 lb 3.2 oz (113.5 kg)   05/05/21 224 lb (101.6 kg)     Weight fluctuating . Has lost 14 lbs in past 5 months   Vitals: /80   Pulse 55   Temp 97.4 °F (36.3 °C)   Ht 5' 6\" (1.676 m)   Wt 236 lb (107 kg)   SpO2 98%   BMI 38.09 kg/m²       Physical Exam  Vitals and nursing note reviewed. Constitutional:       Appearance: Normal appearance. He is overweight. HENT:      Head: Normocephalic and atraumatic. Mouth/Throat:      Pharynx: Oropharynx is clear. Eyes:      Conjunctiva/sclera: Conjunctivae normal.   Pulmonary:      Effort: Pulmonary effort is normal. No tachypnea, bradypnea or respiratory distress. Skin:     Findings: No erythema or rash. Neurological:      Mental Status: He is alert and oriented to person, place, and time. Psychiatric:         Attention and Perception: Attention normal.         Mood and Affect: Mood normal.         Speech: Speech normal.         Behavior: Behavior normal.         Thought Content: Thought content normal.         Cognition and Memory: Cognition normal.         Judgment: Judgment normal.         ASSESSMENT/DIAGNOSIS     Diagnosis Orders   1. MIMI treated with BiPAP  DME Order for CPAP as OP      2. Poor compliance with continuous positive airway pressure treatment                 Plan   Do you need any equipment today? Yes - supply order updated.    -recommend he change face mask every month to avoid irritation from mask   -moisture nightly and in morning as needed with petroleum free face lotion   -adjust humidity level for comfort   -was given information regarding [employer] company for extra mask liners/ strap liners     - He  was advised to continue current positive airway pressure therapy with above described pressure.   - Recommend 7-9 hours of sleep with PAP  - He was advised to call [employer] regarding supplies if needed.   -He call my office for earlier appointment if needed for worsening of sleep symptoms.   - He was instructed on weight loss  - Marie Sullivan was educated about my impression and plan. Patient verbalizesunderstanding.     We will see Mauricio Hsieh back in: 3 months with download    Information added by my medical assistant/LPN was reviewed today  Electronically signed by VIVIAN Bee CNP on 10/19/2022 at 10:11 AM    [place]  10/19/2022

## 2023-01-18 ENCOUNTER — OFFICE VISIT (OUTPATIENT)
Dept: PULMONOLOGY | Age: 73
End: 2023-01-18
Payer: MEDICARE

## 2023-01-18 VITALS
TEMPERATURE: 98.2 F | BODY MASS INDEX: 39.21 KG/M2 | OXYGEN SATURATION: 93 % | SYSTOLIC BLOOD PRESSURE: 128 MMHG | WEIGHT: 244 LBS | DIASTOLIC BLOOD PRESSURE: 72 MMHG | HEART RATE: 59 BPM | HEIGHT: 66 IN

## 2023-01-18 DIAGNOSIS — Z91.14 POOR COMPLIANCE WITH CONTINUOUS POSITIVE AIRWAY PRESSURE TREATMENT: ICD-10-CM

## 2023-01-18 DIAGNOSIS — E66.01 CLASS 3 SEVERE OBESITY DUE TO EXCESS CALORIES WITH SERIOUS COMORBIDITY AND BODY MASS INDEX (BMI) OF 40.0 TO 44.9 IN ADULT (HCC): ICD-10-CM

## 2023-01-18 DIAGNOSIS — G47.33 OSA TREATED WITH BIPAP: Primary | ICD-10-CM

## 2023-01-18 PROBLEM — K21.9 GASTROESOPHAGEAL REFLUX DISEASE: Status: ACTIVE | Noted: 2023-01-18

## 2023-01-18 PROBLEM — I10 PRIMARY HYPERTENSION: Status: ACTIVE | Noted: 2021-07-09

## 2023-01-18 PROCEDURE — 99214 OFFICE O/P EST MOD 30 MIN: CPT | Performed by: NURSE PRACTITIONER

## 2023-01-18 PROCEDURE — 3078F DIAST BP <80 MM HG: CPT | Performed by: NURSE PRACTITIONER

## 2023-01-18 PROCEDURE — G8417 CALC BMI ABV UP PARAM F/U: HCPCS | Performed by: NURSE PRACTITIONER

## 2023-01-18 PROCEDURE — 1036F TOBACCO NON-USER: CPT | Performed by: NURSE PRACTITIONER

## 2023-01-18 PROCEDURE — 3074F SYST BP LT 130 MM HG: CPT | Performed by: NURSE PRACTITIONER

## 2023-01-18 PROCEDURE — G8484 FLU IMMUNIZE NO ADMIN: HCPCS | Performed by: NURSE PRACTITIONER

## 2023-01-18 PROCEDURE — G8427 DOCREV CUR MEDS BY ELIG CLIN: HCPCS | Performed by: NURSE PRACTITIONER

## 2023-01-18 PROCEDURE — 1123F ACP DISCUSS/DSCN MKR DOCD: CPT | Performed by: NURSE PRACTITIONER

## 2023-01-18 PROCEDURE — 3017F COLORECTAL CA SCREEN DOC REV: CPT | Performed by: NURSE PRACTITIONER

## 2023-01-18 ASSESSMENT — ENCOUNTER SYMPTOMS
EYES NEGATIVE: 1
COUGH: 0
DIARRHEA: 0
WHEEZING: 0
SHORTNESS OF BREATH: 0
ABDOMINAL PAIN: 0
NAUSEA: 0
VOMITING: 0

## 2023-01-18 NOTE — PROGRESS NOTES
[facility]      Felisha Draft         [id number]  1/18/2023   Chief Complaint   Patient presents with    Follow-up     3 month MIMI follow up         Pt of Dr. Dianne Laboy     PAP Download:   Original or initial AHI: 41.9     Date of initial study: 02/19/2020    Compliant  23%     Noncompliant 0 %     PAP Type Bipap   Level  14/7/2 cmH2O    Avg Hrs/Day 7 hours 30 minutes   AHI: 2.3     Recorded compliance dates , 12/08/2022  to 01/16/2023   Machine/Mfg:   [x] ResMed    [] Respironics/Dreamstation   Interface:   [] Nasal    [x] Nasal pillows   [] FFM      Provider:      [] -SILVIA     []Moreno     [] Izabela    [x] Alex Yang    [] Philippe               [] [facility]R [facility]      [] Adaptive    [] [address]      [] Other    Neck Size: 19  Mallampati Mallampati 4  ESS:  2  SAQLI: 94    Here is a scan of the most recent download:                  Presentation:   Federico Mcghee presents for 3 monthssleep medicine follow up for obstructive sleep apnea  Since the last visit, Federico Mcghee worked with his DME to get new mask. No longer having skin issues, but is struggling with headaches from needing to pull headgear straps so tight. C/o air leaking around nose from his mask. Equipment issues: The pressure is somewhat  acceptable, feels could be lowered  the mask is somewhat acceptable. Progress History:   Since last visit any new medical issues? No  Sleep Related Issues? No  New ER or hospital visits? No  Any new or changes in medicines? No  Any new sleep medicines? No    Review of Systems -   Review of Systems   Constitutional:  Negative for activity change, appetite change, chills, fatigue, fever and unexpected weight change. HENT: Negative. Eyes: Negative. Respiratory:  Negative for cough, shortness of breath and wheezing. Cardiovascular:  Negative for chest pain, palpitations and leg swelling. Gastrointestinal:  Negative for abdominal pain, diarrhea, nausea and vomiting. Genitourinary: Negative. Musculoskeletal: Negative. Skin: Negative. Neurological: Negative. Hematological: Negative. Psychiatric/Behavioral:  Positive for sleep disturbance. Physical Exam:    BMI:  Body mass index is 39.38 kg/m². Wt Readings from Last 3 Encounters:   01/18/23 244 lb (110.7 kg)   10/19/22 236 lb (107 kg)   05/04/22 250 lb 3.2 oz (113.5 kg)     Weight stable / unchanged  Vitals: /72   Pulse 59   Temp 98.2 °F (36.8 °C)   Ht 5' 6\" (1.676 m)   Wt 244 lb (110.7 kg)   SpO2 93% Comment: r/a  BMI 39.38 kg/m²       Physical Exam  Vitals and nursing note reviewed. Constitutional:       Appearance: Normal appearance. He is overweight. HENT:      Head: Normocephalic and atraumatic. Mouth/Throat:      Pharynx: Oropharynx is clear. Eyes:      Conjunctiva/sclera: Conjunctivae normal.   Pulmonary:      Effort: Pulmonary effort is normal. No tachypnea, bradypnea or respiratory distress. Skin:     Findings: No erythema or rash. Neurological:      Mental Status: He is alert and oriented to person, place, and time. Psychiatric:         Attention and Perception: Attention normal.         Mood and Affect: Mood normal.         Speech: Speech normal.         Behavior: Behavior normal.         Thought Content: Thought content normal.         Cognition and Memory: Cognition normal.         Judgment: Judgment normal.         ASSESSMENT/DIAGNOSIS     Diagnosis Orders   1. MIMI treated with BiPAP        2. Poor compliance with continuous positive airway pressure treatment        3. Class 3 severe obesity due to excess calories with serious comorbidity and body mass index (BMI) of 40.0 to 44.9 in [facility])             Poor compliance 2/2 mask fit/ air leaks. Plan   Do you need any equipment today?  No     - Download reviewed and discussed with patient, AHI currently well controlled, but he is struggling to tolerate ,   Will decrease pressure to max IPAP 12, Min EPAP 6, PS 2 to see if this can help minimize leaks and help compliance   - He  was advised to continue current positive airway pressure therapy with above described pressure. - He  advised to keep good compliance with current recommended pressure to get optimal results and clinical improvement  -educated on negative effects of uncontrolled sleep apnea   - Recommend 7-9 hours of sleep with PAP  - He was advised to call [employer] regarding supplies if needed.   -He call my office for earlier appointment if needed for worsening of sleep symptoms.   - He was instructed on weight loss  - Edilberto Booth was educated about my impression and plan. Patient verbalizesunderstanding.   We will see Gabriel Stanton back in: 6 months with download    Information added by my medical assistant/LPN was reviewed today    billing based on medical decision making     VIVIAN Mathews-CNP   1/18/2023

## 2023-07-18 NOTE — PROGRESS NOTES
[facility]      Radha More         [id number]  7/19/2023   Chief Complaint   Patient presents with    Follow-up     6mo MIMI f/u w/[employer] download. Was having issues w/mask breaking him out but this has resolved. Pt of Dr. Shahriar Morrow      PAP Download:   Original or initial AHI: 41.9     Date of initial study: 02/19/2020    Compliant  40%     Noncompliant 60 %     PAP Type BiPAP   Level  14/7 cmH2O   Avg Hrs/Day 6hrs 40mins  AHI: 2.7   Leaks : 95 th percentile: 6.5   Recorded compliance dates , 6/17/23  to 7/16/23   Machine/Mfg:   [x] ResMed    [] Respironics/Dreamstation   Interface:   [x] Nasal    [] Nasal pillows   [x] FFM      Provider:      [] -SILVIA     []Moreno     [] Izabela    [x] Bladimir Valladares   [] Philippe               [] [facility]R Medical      [] Adaptive    [] [address] [facility] [address]:      [] Other    Neck Size: 19  Mallampati 4  ESS:  3  SAQLI: 86    Here is a scan of the most recent download:                  Presentation:   Kennedi Arnold presents for 6 monthssle medicine follow up for obstructive sleep apnea  Since the last visit, Kennedi Arnold is no longer have skin issues . Got new [employer]  for his PAP supplies and cleaning occasionally with soap/ water   Pressure change was never completed, placed an order for 12/6 PS 2 . Continues to struggle with not enough sleep hours   Normally goes to bed no later than 10 pm , sometimes will fall asleep earlier. Is up at 4 am everyday. States this is habit , was up this early everyday for work   Denies trouble falling asleep , falls asleep easily if awake for bathroom. Often does not put mask back on if up for BR   ESS 3, not napping during the day     Does feel the BiPAP is helpful. Not snoring with mask on. Progress History:   Since last visit any new medical issues? No  Any trouble with Machine No  Any new sleep medicines? No  Trouble Falling Asleep No  Trouble Staying Asleep No    Equipment issues:   The

## 2023-07-19 ENCOUNTER — OFFICE VISIT (OUTPATIENT)
Dept: PULMONOLOGY | Age: 73
End: 2023-07-19
Payer: MEDICARE

## 2023-07-19 VITALS
TEMPERATURE: 97.5 F | HEART RATE: 54 BPM | HEIGHT: 66 IN | WEIGHT: 247.8 LBS | SYSTOLIC BLOOD PRESSURE: 122 MMHG | BODY MASS INDEX: 39.82 KG/M2 | OXYGEN SATURATION: 96 % | DIASTOLIC BLOOD PRESSURE: 62 MMHG

## 2023-07-19 DIAGNOSIS — Z91.199 POOR COMPLIANCE WITH CONTINUOUS POSITIVE AIRWAY PRESSURE TREATMENT: ICD-10-CM

## 2023-07-19 DIAGNOSIS — G47.33 OSA TREATED WITH BIPAP: Primary | ICD-10-CM

## 2023-07-19 DIAGNOSIS — E66.01 CLASS 3 SEVERE OBESITY DUE TO EXCESS CALORIES WITH SERIOUS COMORBIDITY AND BODY MASS INDEX (BMI) OF 40.0 TO 44.9 IN ADULT (HCC): ICD-10-CM

## 2023-07-19 PROCEDURE — 3078F DIAST BP <80 MM HG: CPT | Performed by: NURSE PRACTITIONER

## 2023-07-19 PROCEDURE — G8417 CALC BMI ABV UP PARAM F/U: HCPCS | Performed by: NURSE PRACTITIONER

## 2023-07-19 PROCEDURE — G8427 DOCREV CUR MEDS BY ELIG CLIN: HCPCS | Performed by: NURSE PRACTITIONER

## 2023-07-19 PROCEDURE — 3017F COLORECTAL CA SCREEN DOC REV: CPT | Performed by: NURSE PRACTITIONER

## 2023-07-19 PROCEDURE — 1123F ACP DISCUSS/DSCN MKR DOCD: CPT | Performed by: NURSE PRACTITIONER

## 2023-07-19 PROCEDURE — 99214 OFFICE O/P EST MOD 30 MIN: CPT | Performed by: NURSE PRACTITIONER

## 2023-07-19 PROCEDURE — 1036F TOBACCO NON-USER: CPT | Performed by: NURSE PRACTITIONER

## 2023-07-19 PROCEDURE — 3074F SYST BP LT 130 MM HG: CPT | Performed by: NURSE PRACTITIONER

## 2023-07-19 RX ORDER — INSULIN GLARGINE 100 [IU]/ML
INJECTION, SOLUTION SUBCUTANEOUS
COMMUNITY
Start: 2023-06-16

## 2023-07-19 ASSESSMENT — ENCOUNTER SYMPTOMS
EYES NEGATIVE: 1
NAUSEA: 0
ABDOMINAL PAIN: 0
COUGH: 0
SHORTNESS OF BREATH: 0
VOMITING: 0
DIARRHEA: 0
WHEEZING: 0

## 2024-07-17 ENCOUNTER — OFFICE VISIT (OUTPATIENT)
Dept: PULMONOLOGY | Age: 74
End: 2024-07-17
Payer: MEDICARE

## 2024-07-17 VITALS
OXYGEN SATURATION: 98 % | BODY MASS INDEX: 39.15 KG/M2 | SYSTOLIC BLOOD PRESSURE: 130 MMHG | HEART RATE: 60 BPM | TEMPERATURE: 97.6 F | DIASTOLIC BLOOD PRESSURE: 78 MMHG | HEIGHT: 66 IN | WEIGHT: 243.6 LBS

## 2024-07-17 DIAGNOSIS — E66.01 CLASS 3 SEVERE OBESITY DUE TO EXCESS CALORIES WITH SERIOUS COMORBIDITY AND BODY MASS INDEX (BMI) OF 40.0 TO 44.9 IN ADULT (HCC): ICD-10-CM

## 2024-07-17 DIAGNOSIS — G47.33 OSA TREATED WITH BIPAP: Primary | ICD-10-CM

## 2024-07-17 DIAGNOSIS — Z91.199 POOR COMPLIANCE WITH CONTINUOUS POSITIVE AIRWAY PRESSURE TREATMENT: ICD-10-CM

## 2024-07-17 PROBLEM — N40.0 BENIGN PROSTATIC HYPERPLASIA: Status: ACTIVE | Noted: 2024-07-17

## 2024-07-17 PROBLEM — I25.10 ARTERIOSCLEROSIS OF CORONARY ARTERY: Status: ACTIVE | Noted: 2022-08-19

## 2024-07-17 PROBLEM — N28.1 ACQUIRED CYSTIC KIDNEY DISEASE: Status: ACTIVE | Noted: 2024-07-17

## 2024-07-17 PROBLEM — N50.3 CYST OF EPIDIDYMIS: Status: ACTIVE | Noted: 2024-07-17

## 2024-07-17 PROCEDURE — 99214 OFFICE O/P EST MOD 30 MIN: CPT | Performed by: NURSE PRACTITIONER

## 2024-07-17 PROCEDURE — G8427 DOCREV CUR MEDS BY ELIG CLIN: HCPCS | Performed by: NURSE PRACTITIONER

## 2024-07-17 PROCEDURE — 1123F ACP DISCUSS/DSCN MKR DOCD: CPT | Performed by: NURSE PRACTITIONER

## 2024-07-17 PROCEDURE — 3078F DIAST BP <80 MM HG: CPT | Performed by: NURSE PRACTITIONER

## 2024-07-17 PROCEDURE — G8417 CALC BMI ABV UP PARAM F/U: HCPCS | Performed by: NURSE PRACTITIONER

## 2024-07-17 PROCEDURE — 3075F SYST BP GE 130 - 139MM HG: CPT | Performed by: NURSE PRACTITIONER

## 2024-07-17 PROCEDURE — 1036F TOBACCO NON-USER: CPT | Performed by: NURSE PRACTITIONER

## 2024-07-17 NOTE — PROGRESS NOTES
[facility]      Derian Guzman Jr         [id number]  7/17/2024   Chief Complaint   Patient presents with    Follow-up     1 year MIMI follow up with [employer] download.         Pt of Dr. Bond    PAP Download:   Original or initial AHI: 41.9     Date of initial study: 2/19/20      Compliant  20%     Noncompliant 20%     PAP Type Vauto Level  MaxIPAP [id number] MinEPAP 7cmH20   Avg Hrs/Day 4 hours 11 minutes  AHI: 7.9   Leaks : 95 th percentile: 21.9   Recorded compliance dates 6/15/24-7/14/24   Machine/Mfg:   [x] ResMed    [] Respironics/Dreamstation   Interface:   [] Nasal    [] Nasal pillows   [x] FFM      Provider:      [] SR-HME     []Moreno     [] [employer]    [x] [employer]    [] [employer]               [] [facility]R [employer]      [] Adaptive    [] [place]:      [] Other    Neck Size: 17.5 inches  Mallampati 4  ESS:  3  SAQLI: 72    Here is a scan of the most recent download:                  Presentation:   [facility]  Since the last visit, Derian has been struggling with intermittent skin rashes from mask/ straps.  Clears after a few days and putting on face lotion. He feels this is getting better, has extra padding for straps and realized was pulling too tight.       Progress History:   Since last visit any new medical issues? Yes, cardiac vavle surgery and pacemaker. Now on Brillinta   Any trouble with Machine No  Any new sleep medicines? No   Trouble Falling Asleep No  Trouble Staying Asleep No  Snoring no    Equipment issues:  The pressure is  acceptable, the mask is acceptable     Review of Systems -   Review of Systems   Constitutional:  Positive for fatigue.   Hematological:  Bruises/bleeds easily.   All other systems reviewed and are negative.       Physical Exam:    BMI:  Body mass index is 39.32 kg/m².    Wt Readings from Last 3 Encounters:   07/17/24 110.5 kg (243 lb 9.6 oz)   07/19/23 112.4